# Patient Record
Sex: FEMALE | Race: WHITE | URBAN - NONMETROPOLITAN AREA
[De-identification: names, ages, dates, MRNs, and addresses within clinical notes are randomized per-mention and may not be internally consistent; named-entity substitution may affect disease eponyms.]

---

## 2023-10-24 ENCOUNTER — ON CAMPUS - OUTPATIENT (OUTPATIENT)
Dept: URBAN - NONMETROPOLITAN AREA HOSPITAL 6 | Facility: HOSPITAL | Age: 63
End: 2023-10-24
Payer: MEDICARE

## 2023-10-24 DIAGNOSIS — Z12.11 ENCOUNTER FOR SCREENING FOR MALIGNANT NEOPLASM OF COLON: ICD-10-CM

## 2023-10-24 DIAGNOSIS — K51.90 ULCERATIVE COLITIS, UNSPECIFIED, WITHOUT COMPLICATIONS: ICD-10-CM

## 2023-10-24 DIAGNOSIS — K57.30 DIVERTICULOSIS OF LARGE INTESTINE WITHOUT PERFORATION OR ABS: ICD-10-CM

## 2023-10-24 DIAGNOSIS — K64.9 UNSPECIFIED HEMORRHOIDS: ICD-10-CM

## 2023-10-24 PROCEDURE — 45380 COLONOSCOPY AND BIOPSY: CPT | Mod: 33 | Performed by: INTERNAL MEDICINE

## 2023-11-28 ENCOUNTER — OFFICE (OUTPATIENT)
Dept: URBAN - METROPOLITAN AREA CLINIC 64 | Facility: CLINIC | Age: 63
End: 2023-11-28
Payer: MEDICARE

## 2023-11-28 VITALS
HEART RATE: 56 BPM | HEIGHT: 62 IN | WEIGHT: 238 LBS | DIASTOLIC BLOOD PRESSURE: 67 MMHG | SYSTOLIC BLOOD PRESSURE: 110 MMHG

## 2023-11-28 DIAGNOSIS — R19.4 CHANGE IN BOWEL HABIT: ICD-10-CM

## 2023-11-28 DIAGNOSIS — R63.4 ABNORMAL WEIGHT LOSS: ICD-10-CM

## 2023-11-28 DIAGNOSIS — K55.9 VASCULAR DISORDER OF INTESTINE, UNSPECIFIED: ICD-10-CM

## 2023-11-28 PROCEDURE — 99214 OFFICE O/P EST MOD 30 MIN: CPT | Performed by: INTERNAL MEDICINE

## 2024-01-08 ENCOUNTER — OFFICE (OUTPATIENT)
Dept: URBAN - METROPOLITAN AREA CLINIC 64 | Facility: CLINIC | Age: 64
End: 2024-01-08
Payer: MEDICAID

## 2024-01-08 VITALS
SYSTOLIC BLOOD PRESSURE: 108 MMHG | WEIGHT: 136 LBS | HEIGHT: 62 IN | HEART RATE: 73 BPM | DIASTOLIC BLOOD PRESSURE: 63 MMHG

## 2024-01-08 DIAGNOSIS — K55.9 VASCULAR DISORDER OF INTESTINE, UNSPECIFIED: ICD-10-CM

## 2024-01-08 DIAGNOSIS — R93.3 ABNORMAL FINDINGS ON DIAGNOSTIC IMAGING OF OTHER PARTS OF DI: ICD-10-CM

## 2024-01-08 PROCEDURE — 99214 OFFICE O/P EST MOD 30 MIN: CPT | Performed by: INTERNAL MEDICINE

## 2024-01-08 RX ORDER — DICLOFENAC SODIUM 100 MG/1
TABLET, FILM COATED, EXTENDED RELEASE ORAL
Qty: 60 | Status: COMPLETED
End: 2024-01-08

## 2024-02-13 ENCOUNTER — ON CAMPUS - OUTPATIENT (OUTPATIENT)
Dept: URBAN - METROPOLITAN AREA HOSPITAL 77 | Facility: HOSPITAL | Age: 64
End: 2024-02-13
Payer: MEDICAID

## 2024-02-13 DIAGNOSIS — K52.9 NONINFECTIVE GASTROENTERITIS AND COLITIS, UNSPECIFIED: ICD-10-CM

## 2024-02-13 DIAGNOSIS — K63.5 POLYP OF COLON: ICD-10-CM

## 2024-02-13 DIAGNOSIS — K57.30 DIVERTICULOSIS OF LARGE INTESTINE WITHOUT PERFORATION OR ABS: ICD-10-CM

## 2024-02-13 DIAGNOSIS — K64.8 OTHER HEMORRHOIDS: ICD-10-CM

## 2024-02-13 PROCEDURE — 45380 COLONOSCOPY AND BIOPSY: CPT | Mod: 59 | Performed by: INTERNAL MEDICINE

## 2024-02-13 PROCEDURE — 45385 COLONOSCOPY W/LESION REMOVAL: CPT | Performed by: INTERNAL MEDICINE

## 2024-02-21 ENCOUNTER — OFFICE VISIT (OUTPATIENT)
Age: 64
End: 2024-02-21
Payer: MEDICAID

## 2024-02-21 VITALS
WEIGHT: 135 LBS | HEART RATE: 69 BPM | DIASTOLIC BLOOD PRESSURE: 85 MMHG | SYSTOLIC BLOOD PRESSURE: 122 MMHG | TEMPERATURE: 97.8 F | BODY MASS INDEX: 24.84 KG/M2 | HEIGHT: 62 IN | OXYGEN SATURATION: 98 %

## 2024-02-21 DIAGNOSIS — K63.3 COLONIC ULCER: ICD-10-CM

## 2024-02-21 DIAGNOSIS — K56.699 COLON STRICTURE: Primary | ICD-10-CM

## 2024-02-21 RX ORDER — PRAMIPEXOLE DIHYDROCHLORIDE 0.25 MG/1
0.25 TABLET ORAL DAILY
COMMUNITY

## 2024-02-21 RX ORDER — MONTELUKAST SODIUM 10 MG/1
10 TABLET ORAL NIGHTLY
COMMUNITY

## 2024-02-21 RX ORDER — HYDROCHLOROTHIAZIDE 12.5 MG/1
12.5 TABLET ORAL DAILY
COMMUNITY

## 2024-02-21 RX ORDER — SUCRALFATE 1 G/1
1 TABLET ORAL EVERY 12 HOURS SCHEDULED
COMMUNITY
Start: 2024-02-12 | End: 2024-03-13

## 2024-02-21 RX ORDER — DICYCLOMINE HCL 20 MG
20 TABLET ORAL EVERY 6 HOURS
COMMUNITY

## 2024-02-21 RX ORDER — DULOXETIN HYDROCHLORIDE 60 MG/1
60 CAPSULE, DELAYED RELEASE ORAL DAILY
COMMUNITY
Start: 2024-02-10

## 2024-02-21 RX ORDER — NICOTINE 21 MG/24HR
1 PATCH, TRANSDERMAL 24 HOURS TRANSDERMAL EVERY 24 HOURS
Qty: 60 PATCH | Refills: 1 | Status: SHIPPED | OUTPATIENT
Start: 2024-02-21 | End: 2024-06-20

## 2024-02-21 RX ORDER — UMECLIDINIUM BROMIDE AND VILANTEROL TRIFENATATE 62.5; 25 UG/1; UG/1
1 POWDER RESPIRATORY (INHALATION)
COMMUNITY

## 2024-02-21 RX ORDER — LEVOTHYROXINE SODIUM 50 MCG
50 TABLET ORAL DAILY
COMMUNITY

## 2024-02-21 RX ORDER — BACLOFEN 20 MG/1
20 TABLET ORAL 3 TIMES DAILY
COMMUNITY

## 2024-02-21 RX ORDER — FAMOTIDINE 40 MG/1
40 TABLET, FILM COATED ORAL DAILY
COMMUNITY

## 2024-02-21 RX ORDER — ATORVASTATIN CALCIUM 10 MG/1
10 TABLET, FILM COATED ORAL DAILY
COMMUNITY

## 2024-02-21 RX ORDER — ALBUTEROL SULFATE 90 UG/1
1 AEROSOL, METERED RESPIRATORY (INHALATION) EVERY 4 HOURS PRN
COMMUNITY

## 2024-02-22 NOTE — PROGRESS NOTES
Colorectal Surgery Consultation Note    ID:  Yana Centeno;   : 1960  DATE OF VISIT: 2024    Chief Complaint  Consult (NP Ref by Dr Palacio Colonic Stricture)       History of Present Illness  Yana Centeno is a 63 y.o. female who I was asked to see in consultation by Brianna Emerson MD to evaluate for colonic stricture and ulceration    The patient has been experiencing escalating cramping abdominal pain over the past 12 months, coupled with nausea and intermittent vomiting during meals. Notably, she has experienced a significant weight loss of 60 pounds within the last 6 months. She is a habitual smoker, she currently smokes 1 to 2 packs per day, and she had previously used NSAIDs for arthritis, which has been discontinued. There is no reported blood per rectum, and she denies having fevers or chills.    Dr. Palacio did a colonoscopy in 2023, revealing ulceration in the cecum and ascending colon. Biopsy results from the ulcer site ruled out both inflammatory bowel disease and malignancy. A subsequent colonoscopy in 2024 showed worsening stricture in the right colon, accompanied by worsening ulceration, and patchy mucosal ischemia. Despite utilizing a small caliber colonoscope, the stricture prevented Dr. Palacio from reaching the cecum. Repeated biopsy results did not raise concerns regarding malignancy or inflammatory bowel disease.    The patient denies any familial history of inflammatory bowel disease, colon, or rectal cancer.     Past Medical History  History reviewed. No pertinent past medical history.  Past Surgical History  History reviewed. No pertinent surgical history.  Family History  Family History   Problem Relation Age of Onset    Cancer Mother     Diabetes Father     Gout Father     Cancer Maternal Aunt      No colorectal cancer history in immediate family members.  Social History  Social History     Tobacco Use    Smoking status: Every Day     Packs/day: 1.00      Years: 15.00     Additional pack years: 0.00     Total pack years: 15.00     Types: Cigarettes    Smokeless tobacco: Never   Vaping Use    Vaping Use: Never used   Substance Use Topics    Alcohol use: Never    Drug use: Never     Medication List  @medcurrent@  Allergies  Allergies   Allergen Reactions    Pregabalin Rash     Review of Systems  All systems reviewed and are otherwise negative, pertinent positives noted in the HPI.    Physical Exam  General:  No acute distress  Head: Normocephalic, atraumatic  Neuro: Alert and oriented    Abdomen:  Soft, slight-tender in the right lower quadrant, non-distended, no masses, no hernias, no hepatomegaly, no splenomegaly. No abnormal, audible bowel sounds.    Assessment  -colonic stricture   - colonic ulceration     Plan / Recommendations    The likely origins of her colonic stricture and ulceration point toward a combination of NSAID use and her smoking history, accentuated by the significant atherosclerotic disease evident in her CT scan. However, it's imperative to acknowledge that other potential causes, including inflammatory bowel disease (IBD), malignancy, and infectious etiologies, cannot be definitively ruled out. I have engaged in a comprehensive discussion with the patient, emphasizing the importance of discontinuing smoking. To support her in this, a prescription for a nicotine patch has been sent to her pharmacy, and she has been provided with a toll-free number for smoking cessation assistance.    Considering the presence of a stricture and the escalation of her symptoms, a right colectomy is likely warranted. However, her current active smoking status, elevates the risk of postoperative complications. Hence, I've advised the patient to cease smoking for a minimum of 8 weeks before contemplating surgery to optimize her postoperative outcomes.    In the interim, I have prescribed mesalamine to mitigate inflammation of the colon. Additionally, sucralfate has been  recommended to further address her symptoms and support her gastrointestinal health during this preparatory phase.    I have also personally reviewed the labs which show normal wbc and Hgb of 12.6.  I have also personally reviewed the CT scan images which show right colon thickening   I have also personally reviewed the colonoscopy report which shows stricture and ulceration of right colon   I have also personally reviewed the pathology report which shows colonic ulceration and active inflammation     I have discussed the case and plan with Dr. Palacio.       Kavin Farfan MD  Colon and Rectal Surgery   [facility]

## 2024-03-08 ENCOUNTER — PATIENT ROUNDING (BHMG ONLY) (OUTPATIENT)
Age: 64
End: 2024-03-08
Payer: MEDICAID

## 2024-03-20 ENCOUNTER — OFFICE VISIT (OUTPATIENT)
Age: 64
End: 2024-03-20
Payer: MEDICAID

## 2024-03-20 VITALS
TEMPERATURE: 97.4 F | HEIGHT: 62 IN | BODY MASS INDEX: 25.95 KG/M2 | SYSTOLIC BLOOD PRESSURE: 117 MMHG | DIASTOLIC BLOOD PRESSURE: 73 MMHG | OXYGEN SATURATION: 99 % | WEIGHT: 141 LBS

## 2024-03-20 DIAGNOSIS — K56.699 COLON STRICTURE: Primary | ICD-10-CM

## 2024-03-20 DIAGNOSIS — K63.3 COLONIC ULCER: ICD-10-CM

## 2024-03-20 PROCEDURE — 99214 OFFICE O/P EST MOD 30 MIN: CPT | Performed by: STUDENT IN AN ORGANIZED HEALTH CARE EDUCATION/TRAINING PROGRAM

## 2024-03-20 PROCEDURE — 1159F MED LIST DOCD IN RCRD: CPT | Performed by: STUDENT IN AN ORGANIZED HEALTH CARE EDUCATION/TRAINING PROGRAM

## 2024-03-20 PROCEDURE — 1160F RVW MEDS BY RX/DR IN RCRD: CPT | Performed by: STUDENT IN AN ORGANIZED HEALTH CARE EDUCATION/TRAINING PROGRAM

## 2024-03-20 NOTE — PROGRESS NOTES
Colorectal Surgery Followup Note    ID:  Yana Centeno;   : 1960  DATE OF VISIT: 3/20/2024    Chief Complaint  colon stricture (4 wk f/u colonic stricture)       Subjective    Yana reports significant improvement since we started her on mesalamine. Denies any cramping abdominal pain. She has been going to the bathroom regularly. Denies any blood with stool. She has avoided NSAID use. She is still struggling with quitting smoking.   Exam  General:  No acute distress  Head: Normocephalic, atraumatic  Neuro: Alert and oriented    Abdomen:  Soft, non-tender, non-distended,  No abnormal, audible bowel sounds.      Assessment  - colonic stricture   - colonic ulceration    Plan / Recommendations  -continue with mesalamine for another 4 weeks. Sent script to pharmacy.    - I will discuss repeating colonoscopy with biopsy in 4 weeks with Dr Palacio   - I will discuss with her long term plan after repeat colonoscopy to confirm improvement and resolution of ulceration.   - Emphasized the importance of quitting smoking again    - follow up in 6 to 8 weeks     Kavin Farfan MD  Colon and Rectal Surgery   [facility]

## 2024-05-01 ENCOUNTER — OFFICE VISIT (OUTPATIENT)
Age: 64
End: 2024-05-01
Payer: MEDICAID

## 2024-05-01 VITALS
WEIGHT: 143 LBS | HEIGHT: 62 IN | DIASTOLIC BLOOD PRESSURE: 71 MMHG | TEMPERATURE: 97.6 F | SYSTOLIC BLOOD PRESSURE: 124 MMHG | HEART RATE: 59 BPM | OXYGEN SATURATION: 100 % | BODY MASS INDEX: 26.31 KG/M2

## 2024-05-01 DIAGNOSIS — K56.699 COLON STRICTURE: Primary | ICD-10-CM

## 2024-05-01 DIAGNOSIS — K63.3 COLONIC ULCER: ICD-10-CM

## 2024-05-01 PROCEDURE — 99214 OFFICE O/P EST MOD 30 MIN: CPT | Performed by: STUDENT IN AN ORGANIZED HEALTH CARE EDUCATION/TRAINING PROGRAM

## 2024-05-01 RX ORDER — SILVER SULFADIAZINE 1 %
CREAM (GRAM) TOPICAL
COMMUNITY
Start: 2024-04-26

## 2024-05-01 RX ORDER — SULFAMETHOXAZOLE AND TRIMETHOPRIM 800; 160 MG/1; MG/1
1 TABLET ORAL EVERY 12 HOURS SCHEDULED
COMMUNITY
Start: 2024-04-26

## 2024-05-01 NOTE — PROGRESS NOTES
Colorectal Surgery Followup Note    ID:  Yana Centeno;   : 1960  DATE OF VISIT: 2024    Chief Complaint  Follow-up (6 week follow up colonic stricture )       Chief Complaint  colon stricture (4 wk f/u colonic stricture)        Subjective     Yana reports her symptoms have returned. She feels the cramping abdominal pain.She feels more constipated. Denies any blood with stool. She has avoided NSAID use. She is still struggling with quitting smoking.   Exam  General:  No acute distress  Head: Normocephalic, atraumatic  Neuro: Alert and oriented     Abdomen:  Soft, non-tender, non-distended,  No abnormal, audible bowel sounds.        Assessment  - colonic stricture   - colonic ulceration     Plan / Recommendations  -continue with mesalamine for now  - I will discuss repeating colonoscopy with Dr Palacio   - If symptoms persist, we will proceed with surgery.   - Emphasized the importance of quitting smoking again    - follow up in 6 to 8 weeks      Kavin Farfan MD  Colon and Rectal Surgery   [facility]

## 2024-05-21 DIAGNOSIS — K63.3 COLONIC ULCER: ICD-10-CM

## 2024-05-21 DIAGNOSIS — K56.699 COLON STRICTURE: ICD-10-CM

## 2024-05-22 RX ORDER — MESALAMINE 250 MG/1
500 CAPSULE ORAL 3 TIMES DAILY
Qty: 180 CAPSULE | Refills: 0 | OUTPATIENT
Start: 2024-05-22

## 2024-06-21 ENCOUNTER — OFFICE VISIT (OUTPATIENT)
Age: 64
End: 2024-06-21
Payer: MEDICAID

## 2024-06-21 VITALS
WEIGHT: 158 LBS | BODY MASS INDEX: 29.08 KG/M2 | OXYGEN SATURATION: 98 % | HEIGHT: 62 IN | TEMPERATURE: 97.5 F | HEART RATE: 61 BPM | DIASTOLIC BLOOD PRESSURE: 70 MMHG | SYSTOLIC BLOOD PRESSURE: 129 MMHG

## 2024-06-21 DIAGNOSIS — K63.3 COLONIC ULCER: ICD-10-CM

## 2024-06-21 DIAGNOSIS — K56.699 COLON STRICTURE: Primary | ICD-10-CM

## 2024-06-21 RX ORDER — OMEPRAZOLE 40 MG/1
1 CAPSULE, DELAYED RELEASE ORAL DAILY
COMMUNITY
Start: 2024-03-20

## 2024-06-22 NOTE — PROGRESS NOTES
Colorectal Surgery Followup Note    ID:  Yana Centeno;   : 1960  DATE OF VISIT: 2024    Chief Complaint  Follow-up (F/U colon stricture; colonic ulcer )       Subjective     Yana reports feeling great. No abdominal pain for over 4 weeks. She is watching what she is eating. She has stopped smoking.     Exam  General:  No acute distress  Head: Normocephalic, atraumatic  Neuro: Alert and oriented     Abdomen:  Soft, non-tender, non-distended,  No abnormal, audible bowel sounds.        Assessment  - colonic stricture   - colonic ulceration     Plan / Recommendations  -continue with mesalamine   - She is scheduled for colonoscopy with Dr Palacio in July   - If symptoms persist or no improvement endoscopically, we will proceed with surgery.   - Encouraged her regarding quitting smoking  - follow up in 6 to 8 weeks      Kavin Farfan MD  Colon and Rectal Surgery   [facility]

## 2024-07-09 ENCOUNTER — ON CAMPUS - OUTPATIENT (OUTPATIENT)
Dept: URBAN - METROPOLITAN AREA HOSPITAL 77 | Facility: HOSPITAL | Age: 64
End: 2024-07-09
Payer: MEDICAID

## 2024-07-09 DIAGNOSIS — K63.5 POLYP OF COLON: ICD-10-CM

## 2024-07-09 DIAGNOSIS — R19.7 DIARRHEA, UNSPECIFIED: ICD-10-CM

## 2024-07-09 DIAGNOSIS — K57.30 DIVERTICULOSIS OF LARGE INTESTINE WITHOUT PERFORATION OR ABS: ICD-10-CM

## 2024-07-09 DIAGNOSIS — R10.9 UNSPECIFIED ABDOMINAL PAIN: ICD-10-CM

## 2024-07-09 DIAGNOSIS — R93.3 ABNORMAL FINDINGS ON DIAGNOSTIC IMAGING OF OTHER PARTS OF DI: ICD-10-CM

## 2024-07-09 DIAGNOSIS — K52.9 NONINFECTIVE GASTROENTERITIS AND COLITIS, UNSPECIFIED: ICD-10-CM

## 2024-07-09 PROCEDURE — 45380 COLONOSCOPY AND BIOPSY: CPT | Mod: 59 | Performed by: INTERNAL MEDICINE

## 2024-07-09 PROCEDURE — 45385 COLONOSCOPY W/LESION REMOVAL: CPT | Performed by: INTERNAL MEDICINE

## 2024-08-02 ENCOUNTER — OFFICE VISIT (OUTPATIENT)
Age: 64
End: 2024-08-02
Payer: MEDICAID

## 2024-08-02 VITALS
DIASTOLIC BLOOD PRESSURE: 79 MMHG | TEMPERATURE: 97.3 F | RESPIRATION RATE: 18 BRPM | BODY MASS INDEX: 29.19 KG/M2 | OXYGEN SATURATION: 97 % | HEIGHT: 62 IN | HEART RATE: 62 BPM | WEIGHT: 158.6 LBS | SYSTOLIC BLOOD PRESSURE: 118 MMHG

## 2024-08-02 DIAGNOSIS — K56.699 COLON STRICTURE: Primary | ICD-10-CM

## 2024-08-02 DIAGNOSIS — K63.3 COLONIC ULCER: ICD-10-CM

## 2024-08-02 PROCEDURE — 1160F RVW MEDS BY RX/DR IN RCRD: CPT | Performed by: STUDENT IN AN ORGANIZED HEALTH CARE EDUCATION/TRAINING PROGRAM

## 2024-08-02 PROCEDURE — 1159F MED LIST DOCD IN RCRD: CPT | Performed by: STUDENT IN AN ORGANIZED HEALTH CARE EDUCATION/TRAINING PROGRAM

## 2024-08-02 PROCEDURE — 99213 OFFICE O/P EST LOW 20 MIN: CPT | Performed by: STUDENT IN AN ORGANIZED HEALTH CARE EDUCATION/TRAINING PROGRAM

## 2024-08-02 RX ORDER — SORBITOL SOLUTION 70 %
SOLUTION, ORAL MISCELLANEOUS
COMMUNITY
Start: 2024-06-25

## 2024-08-02 RX ORDER — POLYETHYLENE GLYCOL 3350 17 G/17G
POWDER, FOR SOLUTION ORAL
COMMUNITY
Start: 2024-06-30

## 2024-08-03 DIAGNOSIS — K56.699 COLON STRICTURE: ICD-10-CM

## 2024-08-03 DIAGNOSIS — K63.3 COLONIC ULCER: ICD-10-CM

## 2024-08-04 NOTE — PROGRESS NOTES
Colorectal Surgery Followup Note    ID:  Yana Centeno;   : 1960  DATE OF VISIT: 2024    Chief Complaint  Follow-up ( (F/U colon stricture; colonic ulcer) )       Subjective     Yana reports feeling great. No abdominal pain. She is watching what she is eating. She has stopped smoking. Her last colonoscopy showed significant improvement of chronic ulceration and stricture.     Exam  General:  No acute distress  Head: Normocephalic, atraumatic  Neuro: Alert and oriented     Abdomen:  Soft, non-tender, non-distended,  No abnormal, audible bowel sounds.        Assessment  - colonic stricture   - colonic ulceration     Plan / Recommendations  -Encouraged that clinically and endoscopically her colonic ulceration and stricture has significantly improved.  Is likely due to the anti-inflammatory medication that she is on, mesalamine, and also quitting of smoking.  Will continue with mesalamine for now.  I have also counseled her and have given her resources for cessation of smoking.   -Follow-up as needed     Kavin Farfan MD  Colon and Rectal Surgery   [facility]

## 2024-08-05 RX ORDER — MESALAMINE 250 MG/1
500 CAPSULE ORAL 3 TIMES DAILY
Qty: 180 CAPSULE | Refills: 0 | Status: SHIPPED | OUTPATIENT
Start: 2024-08-05